# Patient Record
Sex: MALE | Race: WHITE | Employment: FULL TIME | URBAN - METROPOLITAN AREA
[De-identification: names, ages, dates, MRNs, and addresses within clinical notes are randomized per-mention and may not be internally consistent; named-entity substitution may affect disease eponyms.]

---

## 2017-04-13 ENCOUNTER — OFFICE VISIT (OUTPATIENT)
Dept: ENDOCRINOLOGY | Age: 37
End: 2017-04-13

## 2017-04-13 VITALS
DIASTOLIC BLOOD PRESSURE: 79 MMHG | RESPIRATION RATE: 16 BRPM | SYSTOLIC BLOOD PRESSURE: 135 MMHG | BODY MASS INDEX: 23.98 KG/M2 | OXYGEN SATURATION: 97 % | HEART RATE: 74 BPM | WEIGHT: 177 LBS | HEIGHT: 72 IN

## 2017-04-13 LAB — HBA1C MFR BLD: 9.3 %

## 2017-04-13 PROCEDURE — 83036 HEMOGLOBIN GLYCOSYLATED A1C: CPT | Performed by: INTERNAL MEDICINE

## 2017-04-13 PROCEDURE — 99214 OFFICE O/P EST MOD 30 MIN: CPT | Performed by: INTERNAL MEDICINE

## 2017-04-13 RX ORDER — NICOTINE 21 MG/24HR
1 PATCH, TRANSDERMAL 24 HOURS TRANSDERMAL EVERY 24 HOURS
Qty: 30 PATCH | Refills: 2 | Status: SHIPPED | OUTPATIENT
Start: 2017-04-13 | End: 2017-12-12 | Stop reason: ALTCHOICE

## 2017-12-12 ENCOUNTER — OFFICE VISIT (OUTPATIENT)
Dept: ENDOCRINOLOGY | Age: 37
End: 2017-12-12

## 2017-12-12 VITALS
DIASTOLIC BLOOD PRESSURE: 79 MMHG | HEART RATE: 77 BPM | BODY MASS INDEX: 26.03 KG/M2 | HEIGHT: 72 IN | SYSTOLIC BLOOD PRESSURE: 132 MMHG | OXYGEN SATURATION: 96 % | WEIGHT: 192.2 LBS | RESPIRATION RATE: 16 BRPM

## 2017-12-12 LAB
CREATININE URINE: 76.1 MG/DL (ref 39–259)
HBA1C MFR BLD: 8.2 %
MICROALBUMIN UR-MCNC: <1.2 MG/DL
MICROALBUMIN/CREAT UR-RTO: NORMAL MG/G (ref 0–30)

## 2017-12-12 PROCEDURE — 99214 OFFICE O/P EST MOD 30 MIN: CPT | Performed by: INTERNAL MEDICINE

## 2017-12-12 PROCEDURE — 83036 HEMOGLOBIN GLYCOSYLATED A1C: CPT | Performed by: INTERNAL MEDICINE

## 2017-12-12 NOTE — PROGRESS NOTES
Seen as f/u patient for diabetes, seen after 8 months    Interim: not following regularly    He was having polyuria for 2 weeks, weight loss 20lb in 1-2 months, dry mouth for 5 days, also has polyphagia   UA showed 500 glucose Ketones 80  Blood glucose 321 in clinic    Current diabetic medications   Lantus 20 units forgetting ocasionally  Novolog 4 units TID  Using 1 for 15gm of CHO     Last A1c-14.1 on 6/15--->7.6 on 8/15-----> 9.5 on 10/16----> 9.3 on 4/17---> 8.2%    Prior visit with dietician: yes  Current diet: on average, 4-5 meals per day  Current exercise: walking   Current monitoring regimen: 3 times a day    Home blood sugar records:   Lows 40-60 at work    No Hx of CAD , PVD, CVA    Hyperlipidemia: LDL 76 on 8/15  Last eye exam: None  Last foot exam:12/17  Last microalbumin to creatinine ratio: 10/16    6/15 Glucose 258 C-peptide 0.5 PEACE >250 ICA -ve     FH: No DM , no other autoimmune disease in the family    SH: Makes mattress, lives alone    Past Medical History:   Diagnosis Date    Anxiety     Diabetes mellitus type 1 ([address]      No past surgical history on file. Current Outpatient Prescriptions   Medication Sig Dispense Refill    ONE TOUCH ULTRA TEST strip USE AS NEEDED 100 strip 3    insulin glargine (LANTUS SOLOSTAR) 100 UNIT/ML injection pen 18 units daily (Patient taking differently: 20 Units 18 units daily) 5 Pen 3    insulin lispro (HUMALOG KWIKPEN) 100 UNIT/ML pen 4-6 units AC TID 5 Pen 3    BD PEN NEEDLE SOREN U/F 32G X 4 MM MISC USE FOUR TIMES DAILY 100 each 3    Blood Glucose Monitoring Suppl (RELION CONFIRM GLUCOSE MONITOR) W/DEVICE KIT Dispense with all supplies needed for qid testing. 1 kit prn     No current facility-administered medications for this visit.         Review of Systems  15lb gain  scanned       Objective:        /79 (Site: Right Arm, Position: Sitting, Cuff Size: Medium Adult)   Pulse 77   Resp 16   Ht 6' (1.829 m)   Wt 192 lb 3.2 oz (87.2 kg)   SpO2 96%   BMI 26.07 kg/m²   Wt Readings from Last 3 Encounters:   12/12/17 192 lb 3.2 oz (87.2 kg)   04/13/17 177 lb (80.3 kg)   10/06/16 177 lb (80.3 kg)     Constitutional: Well-developed, alert, appears stated age, cooperative, in no acute distress  H/E/N/M/T:atraumatic, normocephalic, external ears, nose, lips normal without lesions  Eyes: Arcus Senilis is not present, extraocular muscles are intact  Skeletal muscular: no kyphosis, no gross abnormalities  Skin: Xanthoma/Xanthelasmas are  not present  Psychiatric: Judgement and Insight:  judgement and insight appear normal  Neuro: Normal without focal findings, speech is spontaneous, and movements are coordinated, alert and oriented x3     12/17  Skeletal foot exam is normal, no skin lesions, toenails are normal,+calluses,  10 g monofilament is 10/10 on the right and 10/10 on the left     Lab Reviewed   No components found for: CHLPL  Lab Results   Component Value Date    TRIG 98 08/27/2015    TRIG 280 (H) 06/12/2015     Lab Results   Component Value Date    HDL 77 (H) 08/27/2015    HDL 39 (L) 06/12/2015     Lab Results   Component Value Date    LDLCALC 71 08/27/2015    LDLCALC 112 (H) 06/12/2015     Lab Results   Component Value Date    LABVLDL 20 08/27/2015    LABVLDL 56 06/12/2015     Lab Results   Component Value Date    LABA1C 9.3 04/13/2017       Assessment:     Derek Sierra is a 44 y.o. male with :    1.[id number]: W/u suggestive of T1DM. On basal -bolus , provided diabetes education, self monitoring of blood glucose information. A1c improved , needs more self monitoring. Also discussed insulin pump, now interested, started paper work. Reviewed log, he does have fasting highs at 5am, will make lantus BID  2. Anxiety  3. Tobacco abuse: Quit 10/17       Plan: 1. Change lantus to 11 units BID, self titration discussed  2. Self monitoring of blood glucose  3. Novolog one unit for 15gram of CHO, SSI 1 for 50>150  4. Saw dietician and diabetes

## 2017-12-13 RX ORDER — INSULIN LISPRO 100 [IU]/ML
INJECTION, SOLUTION INTRAVENOUS; SUBCUTANEOUS
Qty: 15 ML | Refills: 3 | Status: SHIPPED | OUTPATIENT
Start: 2017-12-13 | End: 2018-06-06 | Stop reason: SDUPTHER

## 2017-12-14 RX ORDER — PEN NEEDLE, DIABETIC 32GX 5/32"
NEEDLE, DISPOSABLE MISCELLANEOUS
Qty: 150 EACH | Refills: 0 | Status: SHIPPED | OUTPATIENT
Start: 2017-12-14 | End: 2018-06-12 | Stop reason: SDUPTHER

## 2018-03-29 ENCOUNTER — OFFICE VISIT (OUTPATIENT)
Dept: ENDOCRINOLOGY | Age: 38
End: 2018-03-29

## 2018-03-29 VITALS
WEIGHT: 181 LBS | OXYGEN SATURATION: 97 % | HEIGHT: 72 IN | HEART RATE: 91 BPM | SYSTOLIC BLOOD PRESSURE: 160 MMHG | RESPIRATION RATE: 16 BRPM | BODY MASS INDEX: 24.52 KG/M2 | DIASTOLIC BLOOD PRESSURE: 82 MMHG

## 2018-03-29 LAB — HBA1C MFR BLD: 7.7 %

## 2018-03-29 PROCEDURE — 99214 OFFICE O/P EST MOD 30 MIN: CPT | Performed by: INTERNAL MEDICINE

## 2018-03-29 PROCEDURE — 83036 HEMOGLOBIN GLYCOSYLATED A1C: CPT | Performed by: INTERNAL MEDICINE

## 2018-03-29 NOTE — PROGRESS NOTES
Seen as f/u patient for diabetes    Interim: Pump approved, will start training next month    He was having polyuria for 2 weeks, weight loss 20lb in 1-2 months, dry mouth for 5 days, also has polyphagia   UA showed 500 glucose Ketones 80  Blood glucose 321 in clinic    Current diabetic medications   Lantus 11 units BID  Novolog 4 units TID  Using 1 for 15gm of CHO     Last A1c-14.1 on 6/15--->7.6 on 8/15-----> 9.5 on 10/16----> 9.3 on 4/17---> 8.2%----> 7.7%    Prior visit with dietician: yes  Current diet: on average, 4-5 meals per day  Current exercise: walking   Current monitoring regimen: 3 times a day    Home blood sugar records: did not bring a log     100s   Lows during the night at times  Lows 40-60 at work    No Hx of CAD , PVD, CVA    Hyperlipidemia: LDL 76 on 8/15  Last eye exam: None  Last foot exam:12/17  Last microalbumin to creatinine ratio: 12/17    6/15 Glucose 258 C-peptide 0.5 PEACE >250 ICA -ve     FH: No DM , no other autoimmune disease in the family    SH: Makes mattress, lives alone    Past Medical History:   Diagnosis Date    Anxiety     Diabetes mellitus type 1 ([facility] [address]      No past surgical history on file. Current Outpatient Prescriptions   Medication Sig Dispense Refill    BD PEN NEEDLE SOREN U/F 32G X 4 MM MISC USE FOUR TIMES DAILY 150 each 0    HUMALOG KWIKPEN 100 UNIT/ML pen INJECT 4 TO 6 UNITS SUBCUTANEOUSLY THREE TIMES DAILY BEFORE A MEAL 15 mL 3    ONE TOUCH ULTRA TEST strip USE AS NEEDED 100 strip 3    insulin glargine (LANTUS SOLOSTAR) 100 UNIT/ML injection pen 18 units daily (Patient taking differently: 20 Units 18 units daily) 5 Pen 3    Blood Glucose Monitoring Suppl (RELION CONFIRM GLUCOSE MONITOR) W/DEVICE KIT Dispense with all supplies needed for qid testing. 1 kit prn     No current facility-administered medications for this visit. Review of Systems  15lb gain  scanned       Objective: There were no vitals taken for this visit.   Wt Readings from Last 3 Encounters:   12/12/17 192 lb 3.2 oz (87.2 kg)   04/13/17 177 lb (80.3 kg)   10/06/16 177 lb (80.3 kg)     Constitutional: Well-developed, alert, appears stated age, cooperative, in no acute distress  H/E/N/M/T:atraumatic, normocephalic, external ears, nose, lips normal without lesions  Eyes: extraocular muscles are intact  Skeletal muscular: no kyphosis, no gross abnormalities  Skin: Xanthoma/Xanthelasmas are  not present  Psychiatric: Judgement and Insight:  judgement and insight appear normal  Neuro: Normal without focal findings, speech is spontaneous    12/17  Skeletal foot exam is normal, no skin lesions, toenails are normal,+calluses,  10 g monofilament is 10/10 on the right and 10/10 on the left     Lab Reviewed   No components found for: CHLPL  Lab Results   Component Value Date    TRIG 98 08/27/2015    TRIG 280 (H) 06/12/2015     Lab Results   Component Value Date    HDL 77 (H) 08/27/2015    HDL 39 (L) 06/12/2015     Lab Results   Component Value Date    LDLCALC 71 08/27/2015    LDLCALC 112 (H) 06/12/2015     Lab Results   Component Value Date    LABVLDL 20 08/27/2015    LABVLDL 56 06/12/2015     Lab Results   Component Value Date    LABA1C 8.2 12/12/2017       Assessment:     Kumar James is a 43 y.o. male with :    1.[id number]: W/u suggestive of T1DM. On basal -bolus , provided diabetes education, self monitoring of blood glucose information. A1c improved , needs more self monitoring. Also discussed insulin pump, now interested, got pump, will start. States morning lows, will adjust basal dose  He may wanted to be switched to NPH /Regular as may lose insurance if switches job  2. Anxiety  3. Tobacco abuse: Quit 10/17  4. Elevated Blood pressure: Discussed may need BP medication. Plan: 1. Change lantus to 11/9 , self titration discussed  2. Self monitoring of blood glucose  3. Novolog one unit for 15gram of CHO, SSI 1 for 50>150  4. Saw dietician and diabetes educator  5. Start insulin pump

## 2018-05-07 RX ORDER — INSULIN GLARGINE 100 [IU]/ML
INJECTION, SOLUTION SUBCUTANEOUS
Qty: 15 ML | Refills: 3 | Status: SHIPPED | OUTPATIENT
Start: 2018-05-07 | End: 2018-08-03 | Stop reason: SDUPTHER

## 2018-05-24 ENCOUNTER — TELEPHONE (OUTPATIENT)
Dept: FAMILY MEDICINE CLINIC | Age: 38
End: 2018-05-24

## 2018-06-07 RX ORDER — INSULIN LISPRO 100 [IU]/ML
INJECTION, SOLUTION INTRAVENOUS; SUBCUTANEOUS
Qty: 15 ML | Refills: 3 | Status: SHIPPED | OUTPATIENT
Start: 2018-06-07 | End: 2018-07-05 | Stop reason: SDUPTHER

## 2018-06-12 ENCOUNTER — OFFICE VISIT (OUTPATIENT)
Dept: FAMILY MEDICINE CLINIC | Age: 38
End: 2018-06-12

## 2018-06-12 VITALS
HEIGHT: 72 IN | SYSTOLIC BLOOD PRESSURE: 132 MMHG | WEIGHT: 189 LBS | BODY MASS INDEX: 25.6 KG/M2 | DIASTOLIC BLOOD PRESSURE: 80 MMHG | HEART RATE: 67 BPM

## 2018-06-12 DIAGNOSIS — Z23 NEED FOR PROPHYLACTIC VACCINATION AGAINST STREPTOCOCCUS PNEUMONIAE (PNEUMOCOCCUS): ICD-10-CM

## 2018-06-12 DIAGNOSIS — R20.0 NUMBNESS: ICD-10-CM

## 2018-06-12 LAB
A/G RATIO: 2.2 (ref 1.1–2.2)
ALBUMIN SERPL-MCNC: 4.4 G/DL (ref 3.4–5)
ALP BLD-CCNC: 65 U/L (ref 40–129)
ALT SERPL-CCNC: 27 U/L (ref 10–40)
ANION GAP SERPL CALCULATED.3IONS-SCNC: 15 MMOL/L (ref 3–16)
AST SERPL-CCNC: 23 U/L (ref 15–37)
BILIRUB SERPL-MCNC: <0.2 MG/DL (ref 0–1)
BUN BLDV-MCNC: 20 MG/DL (ref 7–20)
CALCIUM SERPL-MCNC: 9.5 MG/DL (ref 8.3–10.6)
CHLORIDE BLD-SCNC: 97 MMOL/L (ref 99–110)
CHOLESTEROL, TOTAL: 160 MG/DL (ref 0–199)
CO2: 27 MMOL/L (ref 21–32)
CREAT SERPL-MCNC: 0.9 MG/DL (ref 0.9–1.3)
GFR AFRICAN AMERICAN: >60
GFR NON-AFRICAN AMERICAN: >60
GLOBULIN: 2 G/DL
GLUCOSE BLD-MCNC: 228 MG/DL (ref 70–99)
HDLC SERPL-MCNC: 83 MG/DL (ref 40–60)
LDL CHOLESTEROL CALCULATED: 62 MG/DL
POTASSIUM SERPL-SCNC: 5.2 MMOL/L (ref 3.5–5.1)
SODIUM BLD-SCNC: 139 MMOL/L (ref 136–145)
TOTAL PROTEIN: 6.4 G/DL (ref 6.4–8.2)
TRIGL SERPL-MCNC: 76 MG/DL (ref 0–150)
TSH REFLEX: 1.66 UIU/ML (ref 0.27–4.2)
VITAMIN B-12: 761 PG/ML (ref 211–911)
VLDLC SERPL CALC-MCNC: 15 MG/DL

## 2018-06-12 PROCEDURE — 36415 COLL VENOUS BLD VENIPUNCTURE: CPT | Performed by: FAMILY MEDICINE

## 2018-06-12 PROCEDURE — 99214 OFFICE O/P EST MOD 30 MIN: CPT | Performed by: FAMILY MEDICINE

## 2018-06-12 ASSESSMENT — PATIENT HEALTH QUESTIONNAIRE - PHQ9
SUM OF ALL RESPONSES TO PHQ QUESTIONS 1-9: 0
SUM OF ALL RESPONSES TO PHQ9 QUESTIONS 1 & 2: 0
1. LITTLE INTEREST OR PLEASURE IN DOING THINGS: 0
2. FEELING DOWN, DEPRESSED OR HOPELESS: 0

## 2018-06-12 ASSESSMENT — ENCOUNTER SYMPTOMS
COUGH: 0
CONSTIPATION: 0
VOMITING: 0
DIARRHEA: 0
SHORTNESS OF BREATH: 0
ABDOMINAL PAIN: 0
NAUSEA: 0

## 2018-06-13 ENCOUNTER — TELEPHONE (OUTPATIENT)
Dept: FAMILY MEDICINE CLINIC | Age: 38
End: 2018-06-13

## 2018-07-05 ENCOUNTER — OFFICE VISIT (OUTPATIENT)
Dept: ENDOCRINOLOGY | Age: 38
End: 2018-07-05

## 2018-07-05 VITALS
BODY MASS INDEX: 24.98 KG/M2 | HEART RATE: 74 BPM | SYSTOLIC BLOOD PRESSURE: 125 MMHG | WEIGHT: 184.4 LBS | DIASTOLIC BLOOD PRESSURE: 70 MMHG | OXYGEN SATURATION: 99 % | HEIGHT: 72 IN

## 2018-07-05 PROCEDURE — 99214 OFFICE O/P EST MOD 30 MIN: CPT | Performed by: INTERNAL MEDICINE

## 2018-07-05 NOTE — PROGRESS NOTES
monitoring of blood glucose information. A1c improved , needs more self monitoring. Start pump but he stopped it due to difficulty with it. Change I:C for novolgo as using more. He may wanted to be switched to NPH /Regular as may lose insurance if switches job  2. Anxiety  3. Tobacco abuse: Quit 10/17  4. Elevated Blood pressure: Discussed may need BP medication. 5.Neuropathy: Discussed medications, he would like to wait    Plan: 1. Change lantus to 15/15  2. Self monitoring of blood glucose  3. Novolog one unit for 12gram of CHO, SSI 1 for 50>150  4. Saw dietician and diabetes educator

## 2018-08-03 ENCOUNTER — OFFICE VISIT (OUTPATIENT)
Dept: FAMILY MEDICINE CLINIC | Age: 38
End: 2018-08-03

## 2018-08-03 VITALS
BODY MASS INDEX: 24.79 KG/M2 | HEART RATE: 63 BPM | WEIGHT: 183 LBS | HEIGHT: 72 IN | SYSTOLIC BLOOD PRESSURE: 125 MMHG | DIASTOLIC BLOOD PRESSURE: 81 MMHG

## 2018-08-03 DIAGNOSIS — R20.2 NUMBNESS AND TINGLING OF LEFT LEG: ICD-10-CM

## 2018-08-03 DIAGNOSIS — R20.0 NUMBNESS AND TINGLING OF LEFT LEG: ICD-10-CM

## 2018-08-03 LAB — HBA1C MFR BLD: 7.5 %

## 2018-08-03 PROCEDURE — 83036 HEMOGLOBIN GLYCOSYLATED A1C: CPT | Performed by: FAMILY MEDICINE

## 2018-08-03 PROCEDURE — 99214 OFFICE O/P EST MOD 30 MIN: CPT | Performed by: FAMILY MEDICINE

## 2018-08-03 ASSESSMENT — ENCOUNTER SYMPTOMS
DIARRHEA: 0
ABDOMINAL PAIN: 0
VOMITING: 0
NAUSEA: 0
COUGH: 0
SHORTNESS OF BREATH: 0
ABDOMINAL DISTENTION: 1
CONSTIPATION: 0

## 2018-08-03 NOTE — PROGRESS NOTES
Chief Complaint   Patient presents with    Other     numbness in left side    Diabetes           HPI:  Salvador Diaz is a 43 y.o. (: 1980) here today  for ER follow up from yesterday at [facility] for numbness on left side. He said that he felt as though he had  No coordination on in his left leg. He said that what he was feeling was like someone grabbing him from the inside on the left side. He said that yesterday he had no pain but today he does have some pain in that side and in his back. He said that he went to the ER because the guys at work told him he needed to. He notes he does have some weakness and felt like he needed to have a high stepping gait this morning    He is compliant with his diabetes medications  without diaphoresis, , sweating, , anxiety, , tremor, , diarrhea,  and hypoglycemia   He is  Checking blood sugars which are running 140s. Review of Systems   Constitutional: Negative for chills and fever. Respiratory: Negative for cough and shortness of breath. Cardiovascular: Negative for chest pain and palpitations. Gastrointestinal: Positive for abdominal distention. Negative for abdominal pain, constipation, diarrhea, nausea and vomiting. Endocrine: Negative for polyuria. Genitourinary: Negative for dysuria. No Known Allergies  New Prescriptions    No medications on file       Meds Prior to visit:  Current Outpatient Prescriptions on File Prior to Visit   Medication Sig Dispense Refill    Naproxen Sodium (ALEVE PO) Take 1 tablet by mouth 2 times daily as needed      insulin lispro (HUMALOG KWIKPEN) 100 UNIT/ML pen INJECT 6-8 UNITS UNDER THE SKIN 5-6 times DAILY BEFORE MEALS 15 mL 3    blood glucose test strips (ONE TOUCH ULTRA TEST) strip USE AS NEEDED 200 strip 0    Insulin Pen Needle (BD PEN NEEDLE SOREN U/F) 32G X 4 MM MISC USE FOUR TIMES DAILY 360 each 0    glucose blood VI test strips (MACHO CONTOUR NEXT TEST) strip 4 times a day As needed.  150 each 3    LANTUS SOLOSTAR 100 UNIT/ML injection pen ADMINISTER 18 UNITS UNDER THE SKIN DAILY (Patient taking differently: ADMINISTER 15 UNITS UNDER THE SKIN BID) 15 mL 3    Blood Glucose Monitoring Suppl (RELION CONFIRM GLUCOSE MONITOR) W/DEVICE KIT Dispense with all supplies needed for qid testing. 1 kit prn     No current facility-administered medications on file prior to visit. Past Medical History:   Diagnosis Date    Anxiety     Diabetes mellitus type 1 ([address]      No past surgical history on file. Family History   Problem Relation Age of Onset    Other Mother         AW   [facility] Other Father         AW     Social History   Substance Use Topics    Smoking status: Current Every Day Smoker     Packs/day: 1.00     Types: Cigarettes     Last attempt to quit: 6/15/2011    Smokeless tobacco: Never Used      Comment: contemplative    Alcohol use 3.6 - 7.2 oz/week     6 - 12 Cans of beer per week      Comment: occasionally       Objective   /81   Pulse 63   Ht 6' (1.829 m)   Wt 183 lb (83 kg)   BMI 24.82 kg/m²   Wt Readings from Last 3 Encounters:   08/03/18 183 lb (83 kg)   08/02/18 182 lb 1.6 oz (82.6 kg)   07/05/18 184 lb 6.4 oz (83.6 kg)       Physical Exam   Constitutional: He appears well-developed and well-nourished. Cardiovascular: Normal rate and regular rhythm. Exam reveals no gallop and no friction rub. No murmur heard. Pulmonary/Chest: Effort normal and breath sounds normal. He has no wheezes. He has no rales. Abdominal: Soft. Bowel sounds are normal. He exhibits no distension and no mass. There is no tenderness. Neurological: Coordination and gait normal. He displays no Babinski's sign on the right side. He displays no Babinski's sign on the left side. Reflex Scores:       Tricep reflexes are 2+ on the right side and 2+ on the left side. Bicep reflexes are 2+ on the right side and 2+ on the left side.        Brachioradialis reflexes are 2+ on the right side and 2+ on the

## 2018-08-11 ENCOUNTER — HOSPITAL ENCOUNTER (OUTPATIENT)
Dept: MRI IMAGING | Age: 38
Discharge: HOME | End: 2018-08-11
Attending: FAMILY MEDICINE | Admitting: FAMILY MEDICINE

## 2018-08-11 DIAGNOSIS — R20.0 ANESTHESIA OF SKIN: ICD-10-CM

## 2018-08-11 DIAGNOSIS — R20.2 NUMBNESS AND TINGLING OF LEFT LEG: ICD-10-CM

## 2018-08-11 DIAGNOSIS — R20.0 NUMBNESS AND TINGLING OF LEFT LEG: ICD-10-CM

## 2018-08-15 ENCOUNTER — TELEPHONE (OUTPATIENT)
Dept: FAMILY MEDICINE CLINIC | Age: 38
End: 2018-08-15

## 2018-08-15 NOTE — TELEPHONE ENCOUNTER
Pt called in regarding message left. Adv pt of his MRI results. Pt said okay. He had no additional questions.

## 2019-01-23 ENCOUNTER — TELEPHONE (OUTPATIENT)
Dept: PRIMARY CARE CLINIC | Age: 39
End: 2019-01-23

## 2019-01-23 ENCOUNTER — OFFICE VISIT (OUTPATIENT)
Dept: PRIMARY CARE CLINIC | Age: 39
End: 2019-01-23
Payer: COMMERCIAL

## 2019-01-23 VITALS
WEIGHT: 183 LBS | HEART RATE: 86 BPM | SYSTOLIC BLOOD PRESSURE: 132 MMHG | DIASTOLIC BLOOD PRESSURE: 82 MMHG | BODY MASS INDEX: 24.79 KG/M2 | TEMPERATURE: 98.2 F | OXYGEN SATURATION: 98 % | HEIGHT: 72 IN

## 2019-01-23 DIAGNOSIS — Z23 NEED FOR INFLUENZA VACCINATION: ICD-10-CM

## 2019-01-23 DIAGNOSIS — T25.521A: ICD-10-CM

## 2019-01-23 DIAGNOSIS — R03.0 ELEVATED BP WITHOUT DIAGNOSIS OF HYPERTENSION: ICD-10-CM

## 2019-01-23 DIAGNOSIS — M79.609 PARESTHESIA AND PAIN OF LEFT EXTREMITY: ICD-10-CM

## 2019-01-23 DIAGNOSIS — Z23 NEED FOR PNEUMOCOCCAL VACCINATION: ICD-10-CM

## 2019-01-23 DIAGNOSIS — R20.2 PARESTHESIA AND PAIN OF LEFT EXTREMITY: ICD-10-CM

## 2019-01-23 LAB
A/G RATIO: 2.3 (ref 1.1–2.2)
ALBUMIN SERPL-MCNC: 5 G/DL (ref 3.4–5)
ALP BLD-CCNC: 64 U/L (ref 40–129)
ALT SERPL-CCNC: 27 U/L (ref 10–40)
ANION GAP SERPL CALCULATED.3IONS-SCNC: 16 MMOL/L (ref 3–16)
AST SERPL-CCNC: 17 U/L (ref 15–37)
BASOPHILS ABSOLUTE: 0.1 K/UL (ref 0–0.2)
BASOPHILS RELATIVE PERCENT: 0.9 %
BILIRUB SERPL-MCNC: 0.4 MG/DL (ref 0–1)
BILIRUBIN, POC: NORMAL
BLOOD URINE, POC: NORMAL
BUN BLDV-MCNC: 11 MG/DL (ref 7–20)
CALCIUM SERPL-MCNC: 9.7 MG/DL (ref 8.3–10.6)
CHLORIDE BLD-SCNC: 99 MMOL/L (ref 99–110)
CHOLESTEROL, TOTAL: 169 MG/DL (ref 0–199)
CLARITY, POC: CLEAR
CO2: 23 MMOL/L (ref 21–32)
COLOR, POC: YELLOW
CREAT SERPL-MCNC: 0.7 MG/DL (ref 0.9–1.3)
CREATININE URINE POCT: 50
EOSINOPHILS ABSOLUTE: 0.2 K/UL (ref 0–0.6)
EOSINOPHILS RELATIVE PERCENT: 2.1 %
GFR AFRICAN AMERICAN: >60
GFR NON-AFRICAN AMERICAN: >60
GLOBULIN: 2.2 G/DL
GLUCOSE BLD-MCNC: 199 MG/DL (ref 70–99)
GLUCOSE URINE, POC: 100
HCT VFR BLD CALC: 43.7 % (ref 40.5–52.5)
HDLC SERPL-MCNC: 87 MG/DL (ref 40–60)
HEMOGLOBIN: 14.7 G/DL (ref 13.5–17.5)
KETONES, POC: 15
LDL CHOLESTEROL CALCULATED: 68 MG/DL
LEUKOCYTE EST, POC: NORMAL
LYMPHOCYTES ABSOLUTE: 2.7 K/UL (ref 1–5.1)
LYMPHOCYTES RELATIVE PERCENT: 31.9 %
MCH RBC QN AUTO: 30.1 PG (ref 26–34)
MCHC RBC AUTO-ENTMCNC: 33.5 G/DL (ref 31–36)
MCV RBC AUTO: 90 FL (ref 80–100)
MICROALBUMIN/CREAT 24H UR: 10 MG/G{CREAT}
MICROALBUMIN/CREAT UR-RTO: <30
MONOCYTES ABSOLUTE: 0.7 K/UL (ref 0–1.3)
MONOCYTES RELATIVE PERCENT: 9 %
NEUTROPHILS ABSOLUTE: 4.7 K/UL (ref 1.7–7.7)
NEUTROPHILS RELATIVE PERCENT: 56.1 %
NITRITE, POC: NORMAL
PDW BLD-RTO: 12.9 % (ref 12.4–15.4)
PH, POC: 6
PLATELET # BLD: 334 K/UL (ref 135–450)
PMV BLD AUTO: 8.8 FL (ref 5–10.5)
POTASSIUM SERPL-SCNC: 4.2 MMOL/L (ref 3.5–5.1)
PROTEIN, POC: NORMAL
RBC # BLD: 4.86 M/UL (ref 4.2–5.9)
SODIUM BLD-SCNC: 138 MMOL/L (ref 136–145)
SPECIFIC GRAVITY, POC: 1.01
TOTAL PROTEIN: 7.2 G/DL (ref 6.4–8.2)
TRIGL SERPL-MCNC: 68 MG/DL (ref 0–150)
TSH SERPL DL<=0.05 MIU/L-ACNC: 1.52 UIU/ML (ref 0.27–4.2)
UROBILINOGEN, POC: 0.2
VLDLC SERPL CALC-MCNC: 14 MG/DL
WBC # BLD: 8.3 K/UL (ref 4–11)

## 2019-01-23 PROCEDURE — 90686 IIV4 VACC NO PRSV 0.5 ML IM: CPT | Performed by: FAMILY MEDICINE

## 2019-01-23 PROCEDURE — 90472 IMMUNIZATION ADMIN EACH ADD: CPT | Performed by: FAMILY MEDICINE

## 2019-01-23 PROCEDURE — 99214 OFFICE O/P EST MOD 30 MIN: CPT | Performed by: FAMILY MEDICINE

## 2019-01-23 PROCEDURE — 90732 PPSV23 VACC 2 YRS+ SUBQ/IM: CPT | Performed by: FAMILY MEDICINE

## 2019-01-23 PROCEDURE — 90471 IMMUNIZATION ADMIN: CPT | Performed by: FAMILY MEDICINE

## 2019-01-23 PROCEDURE — 81002 URINALYSIS NONAUTO W/O SCOPE: CPT | Performed by: FAMILY MEDICINE

## 2019-01-23 PROCEDURE — 82044 UR ALBUMIN SEMIQUANTITATIVE: CPT | Performed by: FAMILY MEDICINE

## 2019-01-23 ASSESSMENT — ENCOUNTER SYMPTOMS
CONSTIPATION: 0
ABDOMINAL PAIN: 0
ABDOMINAL DISTENTION: 0
WHEEZING: 0
COUGH: 0
SHORTNESS OF BREATH: 0
NAUSEA: 0

## 2019-01-24 LAB
ESTIMATED AVERAGE GLUCOSE: 205.9 MG/DL
HBA1C MFR BLD: 8.8 %

## 2020-06-01 RX ORDER — BLOOD SUGAR DIAGNOSTIC
STRIP MISCELLANEOUS
Qty: 100 STRIP | Refills: 1 | OUTPATIENT
Start: 2020-06-01